# Patient Record
Sex: FEMALE | Race: WHITE
[De-identification: names, ages, dates, MRNs, and addresses within clinical notes are randomized per-mention and may not be internally consistent; named-entity substitution may affect disease eponyms.]

---

## 2018-01-08 ENCOUNTER — HOSPITAL ENCOUNTER (INPATIENT)
Dept: HOSPITAL 45 - C.OPB | Age: 33
LOS: 2 days | Discharge: HOME | End: 2018-01-10
Attending: OBSTETRICS & GYNECOLOGY | Admitting: OBSTETRICS & GYNECOLOGY
Payer: COMMERCIAL

## 2018-01-08 VITALS — DIASTOLIC BLOOD PRESSURE: 66 MMHG | HEART RATE: 80 BPM | TEMPERATURE: 98.6 F | SYSTOLIC BLOOD PRESSURE: 106 MMHG

## 2018-01-08 VITALS — SYSTOLIC BLOOD PRESSURE: 100 MMHG | HEART RATE: 85 BPM | DIASTOLIC BLOOD PRESSURE: 66 MMHG | TEMPERATURE: 98.78 F

## 2018-01-08 VITALS
HEIGHT: 67.01 IN | BODY MASS INDEX: 29.19 KG/M2 | WEIGHT: 186 LBS | HEIGHT: 67.01 IN | BODY MASS INDEX: 29.19 KG/M2 | WEIGHT: 186 LBS

## 2018-01-08 DIAGNOSIS — Z22.330: ICD-10-CM

## 2018-01-08 LAB
EOSINOPHIL NFR BLD AUTO: 208 K/UL (ref 130–400)
HCT VFR BLD CALC: 36 % (ref 37–47)
HGB BLD-MCNC: 12.1 G/DL (ref 12–16)
MCH RBC QN AUTO: 30.6 PG (ref 25–34)
MCHC RBC AUTO-ENTMCNC: 33.6 G/DL (ref 32–36)
MCV RBC AUTO: 90.9 FL (ref 80–100)
PMV BLD AUTO: 10.4 FL (ref 7.4–10.4)
RED CELL DISTRIBUTION WIDTH CV: 12.9 % (ref 11.5–14.5)
RED CELL DISTRIBUTION WIDTH SD: 43.1 FL (ref 36.4–46.3)
WBC # BLD AUTO: 11.59 K/UL (ref 4.8–10.8)

## 2018-01-08 RX ADMIN — SODIUM CHLORIDE, SODIUM LACTATE, POTASSIUM CHLORIDE, AND CALCIUM CHLORIDE SCH MLS/HR: 600; 310; 30; 20 INJECTION, SOLUTION INTRAVENOUS at 13:22

## 2018-01-08 RX ADMIN — SODIUM CHLORIDE, SODIUM LACTATE, POTASSIUM CHLORIDE, AND CALCIUM CHLORIDE SCH MLS/HR: 600; 310; 30; 20 INJECTION, SOLUTION INTRAVENOUS at 16:41

## 2018-01-08 RX ADMIN — Medication PRN MG: at 21:22

## 2018-01-08 RX ADMIN — SODIUM CHLORIDE, SODIUM LACTATE, POTASSIUM CHLORIDE, AND CALCIUM CHLORIDE SCH MLS/HR: 600; 310; 30; 20 INJECTION, SOLUTION INTRAVENOUS at 12:30

## 2018-01-08 RX ADMIN — DOCUSATE SODIUM SCH MG: 100 CAPSULE, LIQUID FILLED ORAL at 21:22

## 2018-01-08 NOTE — OPERATIVE REPORT
DATE OF OPERATION:  2018

 

This is a 32-year-old  3, para 3, general health is good, blood type

is A positive, rubella immune, due date is 2018 was admitted through

the office connie rupture of membranes.  She screened a vaginal beta strep

positive.  After coming to the hospital, she was given penicillin 6 million

units and then 4 hours later 4 million more.  During this time, she had

sporadic decelerations at times bradycardia for 2-4 minutes; however, they

began to unresolve and towards the end the monitor pattern looked greatly

improved with good variability.  Membranes ruptured spontaneously at about 9

cm.  There was a fairly thick meconium.  The pediatrician was requested to be

present at the time of delivery which she was.  She went to full dilatation

with about 3 pushes, pushed out a live infant via direct occiput anterior

position over an intact perineum.  Infant was suctioned thoroughly through

the mouth and the nose.  There was a tight nuchal cord x3 around the neck and

this had to be clamped and cut and unround several times.  After the infant

was born, attended to by the pediatrician, Dr. Ennis who was present at the

time of delivery.  I will defer Apgars to him.  Following this, cord blood

was taken.  It was noted that the cord was meconium stained with IV Pitocin

running.  She delivered the placenta spontaneously.  Uterus contracted

nicely.  Blood loss was estimated 200 mL.  Perineum was intact.  The patient

tolerated the procedure well and left the delivery room in good condition.

 

 

I attest to the content of the Intraoperative Record and any orders documented therein. Any exception
s are noted below.

## 2018-01-09 VITALS
TEMPERATURE: 98.42 F | DIASTOLIC BLOOD PRESSURE: 65 MMHG | SYSTOLIC BLOOD PRESSURE: 104 MMHG | OXYGEN SATURATION: 97 % | HEART RATE: 77 BPM

## 2018-01-09 VITALS — DIASTOLIC BLOOD PRESSURE: 70 MMHG | SYSTOLIC BLOOD PRESSURE: 110 MMHG | TEMPERATURE: 98.06 F | HEART RATE: 76 BPM

## 2018-01-09 VITALS — DIASTOLIC BLOOD PRESSURE: 60 MMHG | SYSTOLIC BLOOD PRESSURE: 96 MMHG | HEART RATE: 79 BPM | TEMPERATURE: 98.78 F

## 2018-01-09 VITALS — DIASTOLIC BLOOD PRESSURE: 67 MMHG | HEART RATE: 73 BPM | TEMPERATURE: 98.24 F | SYSTOLIC BLOOD PRESSURE: 103 MMHG

## 2018-01-09 LAB
HCT VFR BLD CALC: 32.5 % (ref 37–47)
HGB BLD-MCNC: 11 G/DL (ref 12–16)

## 2018-01-09 RX ADMIN — Medication SCH TAB: at 08:50

## 2018-01-09 RX ADMIN — DOCUSATE SODIUM SCH MG: 100 CAPSULE, LIQUID FILLED ORAL at 20:33

## 2018-01-09 RX ADMIN — Medication PRN MG: at 08:51

## 2018-01-09 RX ADMIN — DOCUSATE SODIUM SCH MG: 100 CAPSULE, LIQUID FILLED ORAL at 08:49

## 2018-01-09 RX ADMIN — FERROUS SULFATE TAB EC 325 MG (65 MG FE EQUIVALENT) SCH MG: 325 (65 FE) TABLET DELAYED RESPONSE at 08:50

## 2018-01-09 NOTE — PROGRESS NOTE
Subjective


Jan 9, 2018.


Subjective


conversation w/ patient


Ambulation:  ambulating normally


Voiding:  no voiding problems


Passing Gas:  Yes


Diet Tolerance:  Regular Diet


Lochia:  Small


Feeding Type:  Breast Feeding





Review of Systems


Constitutional:  + fever





Objective


Vital Signs











  Date Time  Temp Pulse Resp B/P (MAP) Pulse Ox O2 Delivery O2 Flow Rate FiO2


 


1/9/18 08:00 36.7 76 18 110/70 (83)  Room Air  


 


1/9/18 03:50 37.1 79 18 96/60 (72)  Room Air  


 


1/8/18 23:00      Room Air  


 


1/8/18 23:00 37.0 80 18 106/66 (79)  Room Air  


 


1/8/18 21:10 37.1 85 18 100/66 (77)  Room Air  











Physical Exam


General Appearance:  WELL-APPEARING


Abdomen:  non tender


Fundus:  Firm, Non-Tender


Extremities:  no pedal edema, no calf tenderness





Laboratory Results





Last 24 Hours








Test


  1/8/18


11:46 1/9/18


06:03


 


White Blood Count 11.59 K/uL  


 


Red Blood Count 3.96 M/uL  


 


Hemoglobin 12.1 g/dL  11.0 g/dL 


 


Hematocrit 36.0 %  32.5 % 


 


Mean Corpuscular Volume 90.9 fL  


 


Mean Corpuscular Hemoglobin 30.6 pg  


 


Mean Corpuscular Hemoglobin


Concent 33.6 g/dl 


  


 


 


RDW Standard Deviation 43.1 fL  


 


RDW Coefficient of Variation 12.9 %  


 


Platelet Count 208 K/uL  


 


Mean Platelet Volume 10.4 fL  











Assessment and Plan


Post-Partum


Day#:  1

## 2018-01-10 VITALS — HEART RATE: 60 BPM | SYSTOLIC BLOOD PRESSURE: 102 MMHG | TEMPERATURE: 98.42 F | DIASTOLIC BLOOD PRESSURE: 67 MMHG

## 2018-01-10 VITALS — HEART RATE: 72 BPM | TEMPERATURE: 98.06 F | DIASTOLIC BLOOD PRESSURE: 58 MMHG | SYSTOLIC BLOOD PRESSURE: 107 MMHG

## 2018-01-10 VITALS — DIASTOLIC BLOOD PRESSURE: 67 MMHG | HEART RATE: 60 BPM | TEMPERATURE: 98.42 F

## 2018-01-10 RX ADMIN — DOCUSATE SODIUM SCH MG: 100 CAPSULE, LIQUID FILLED ORAL at 08:23

## 2018-01-10 RX ADMIN — FERROUS SULFATE TAB EC 325 MG (65 MG FE EQUIVALENT) SCH MG: 325 (65 FE) TABLET DELAYED RESPONSE at 08:23

## 2018-01-10 RX ADMIN — Medication SCH TAB: at 08:23

## 2018-01-10 NOTE — DISCHARGE INSTRUCTIONS
Discharge Instructions


Date of Service


Helio 10, 2018.





Admission


Reason for Admission:  R/O Rupture Of Membranes





Discharge


Discharge Diagnosis / Problem:  tight nuchal cord x 3 meconium





Discharge Goals


Goal(s):  Routine recovery after delivery





Activity Recommendations


Activity Limitations:  as noted below


ACTIVITY RECOMMENDATIONS:





* Gradual return to full activity over the next 2-3 weeks.


* No lifting - nothing heavier than baby over the next 2-3 weeks.


* Do not engage in vigorous exercise, sexual activity or sports until cleared 

by 


   your physician.


* Do not drive or operate any motorized equipment until cleared by your 

physician.


* You may shower/bathe daily.








DIET:





Resume Previous Diet





If Breast-feeding:


*  Increase caloric intake by 500 calories, eat 3 well balanced meals,


    2 high protein snacks a day and drink 6-8 8oz. glasses of fluid per day.





BREAST CARE:





If you are not breast feeding:





*  Wear a supportive bra 24 hours a day for one to two weeks.


*  Avoid stimulating your breasts and nipples as much as possible during the 


    first few weeks after delivery.


*  When taking a shower, have the warm water hit your back, not breasts.


*  When your breasts feel full, apply ice packs.  Usually three to four times a 


    day helps ease the discomfort.


*  Take a mild pain medication (Tylenol / Motrin) when you are uncomfortable.





If breast feeding:





*  Use breast milk to lubricate nipples.  Lansinoh cream may be used for sore 

nipples. 


    You do not need to remove cream prior to breast feeding.  If using a 

different brand


   of cream, check the label for directions regarding removal of cream prior to 

nursing.


*  Wear a supportive bra.


*  If having problems with breasts or breast feeding, call a lactation 

consultant or your 


    health care provider.








OVER THE COUNTER MEDICATION:





*  For discomfort or pain, you may use Acetaminophen (Tylenol), Ibuprofen (Advil

), or 


    Naproxen (Aleve) following the package directions. 


*  For constipation you may use Colace following the package directions.








SPECIAL CARE INSTRUCTIONS:





* Vaginal rest (no tampons, douching, intercourse) until after postpartum doctor

's visit.


* Birth control as discussed with doctor.


* Verbalizes understanding of car seat law as reviewed with patient nursing.


* Car Seat hand-out given and reviewed with patient by nursing.


* Shaken baby information reviewed with patient by nursing.


 





Call you doctor if:





*  Temperature greater than or equal to 100.4 degrees F or 38.0 degrees C.  

Take 


    your temperature twice daily for a week.


*  Bleeding becomes heavier than the heaviest part of your period - saturating 

a 


    sanitary pad within an hour.


*  Passing large clots.


*  Bleeding has a foul smelling odor.


*  Signs and symptoms of phlebitis: leg pain, warm, red or swollen area on leg.


*  "Baby Blues" lasting longer than two weeks.





++ If you have had a  and incision has increased pain, redness, 

swelling, 


    presence of any drainage, or if the incision starts to open up.





If you have any questions or concerns, call your health care practitioner at


[phone].








FOLLOW-UP VISIT:





Please call the office at [phone] to schedule a 6 week postpartum 

examination.











.





Instructions / Follow-Up


Instructions / Follow-Up


ACTIVITY RECOMMENDATIONS:





* Gradual return to full activity over the next 2-3 weeks.


* No lifting - nothing heavier than baby over the next 2-3 weeks.


* Do not engage in vigorous exercise, sexual activity or sports until cleared 

by 


   your physician.


* Do not drive or operate any motorized equipment until cleared by your 

physician.


* You may shower/bathe daily.








DIET:





Resume Previous Diet





If Breast-feeding:


*  Increase caloric intake by 500 calories, eat 3 well balanced meals,


    2 high protein snacks a day and drink 6-8 8oz. glasses of fluid per day.





BREAST CARE:





If you are not breast feeding:





*  Wear a supportive bra 24 hours a day for one to two weeks.


*  Avoid stimulating your breasts and nipples as much as possible during the 


    first few weeks after delivery.


*  When taking a shower, have the warm water hit your back, not breasts.


*  When your breasts feel full, apply ice packs.  Usually three to four times a 


    day helps ease the discomfort.


*  Take a mild pain medication (Tylenol / Motrin) when you are uncomfortable.





If breast feeding:





*  Use breast milk to lubricate nipples.  Lansinoh cream may be used for sore 

nipples. 


    You do not need to remove cream prior to breast feeding.  If using a 

different brand


   of cream, check the label for directions regarding removal of cream prior to 

nursing.


*  Wear a supportive bra.


*  If having problems with breasts or breast feeding, call a lactation 

consultant or your 


    health care provider.








OVER THE COUNTER MEDICATION:





*  For discomfort or pain, you may use Acetaminophen (Tylenol), Ibuprofen (Advil

), or 


    Naproxen (Aleve) following the package directions. 


*  For constipation you may use Colace following the package directions.








SPECIAL CARE INSTRUCTIONS:





* Vaginal rest (no tampons, douching, intercourse) until after postpartum doctor

's visit.


* Birth control as discussed with doctor.


* Verbalizes understanding of car seat law as reviewed with patient nursing.


* Car Seat hand-out given and reviewed with patient by nursing.


* Shaken baby information reviewed with patient by nursing.


 





Call you doctor if:





*  Temperature greater than or equal to 100.4 degrees F or 38.0 degrees C.  

Take 


    your temperature twice daily for a week.


*  Bleeding becomes heavier than the heaviest part of your period - saturating 

a 


    sanitary pad within an hour.


*  Passing large clots.


*  Bleeding has a foul smelling odor.


*  Signs and symptoms of phlebitis: leg pain, warm, red or swollen area on leg.


*  "Baby Blues" lasting longer than two weeks.





++ If you have had a  and incision has increased pain, redness, 

swelling, 


    presence of any drainage, or if the incision starts to open up.





If you have any questions or concerns, call your health care practitioner at


[phone].








FOLLOW-UP VISIT:





Please call the office at [phone] to schedule a 6 week postpartum 

examination.








Current Hospital Diet


Patient's current hospital diet: Regular Diet





Discharge Diet


Recommended Diet:  Regular Diet





Pending Studies


Studies pending at discharge:  no





Medical Emergencies








.


Who to Call and When:





Medical Emergencies:  If at any time you feel your situation is an emergency, 

please call 911 immediately.





.





Non-Emergent Contact


Non-Emergency issues call your:  Gynecologist


Call Non-Emergent contact if:  temperature is above 100.5





.


.








"Provider Documentation" section prepared by Neil Ramirez.








.





VTE Core Measure


Inpt VTE Proph given/why not?:  Treatment not indicated

## 2018-01-10 NOTE — PROGRESS NOTE
Subjective


Helio 10, 2018.


Subjective


conversation w/ patient


Ambulation:  ambulating normally, limited ambulation


Voiding:  no voiding problems


Passing Gas:  Yes


Diet Tolerance:  Regular Diet


Lochia:  Small


Feeding Type:  Breast Feeding





Review of Systems


Constitutional:  + fever





Objective


Vital Signs











  Date Time  Temp Pulse Resp B/P (MAP) Pulse Ox O2 Delivery O2 Flow Rate FiO2


 


1/10/18 00:05 36.7 72 18 107/58 (74)  Room Air  


 


1/10/18 00:05      Room Air  


 


1/9/18 15:53 36.8 73 18 103/67 (79)  Room Air  


 


1/9/18 15:30      Room Air  


 


1/9/18 11:10 36.9 77 16 104/65 (78) 97 Room Air  











Physical Exam


General Appearance:  WELL-APPEARING


Abdomen:  non tender


Fundus:  Firm, Non-Tender


Extremities:  no pedal edema, no calf tenderness





Assessment and Plan


Post-Partum


Day#:  1